# Patient Record
Sex: MALE | Race: WHITE
[De-identification: names, ages, dates, MRNs, and addresses within clinical notes are randomized per-mention and may not be internally consistent; named-entity substitution may affect disease eponyms.]

---

## 2017-01-14 ENCOUNTER — HOSPITAL ENCOUNTER (INPATIENT)
Dept: HOSPITAL 84 - D.REHAB | Age: 77
LOS: 6 days | Discharge: HOME HEALTH | DRG: 65 | End: 2017-01-20
Attending: FAMILY MEDICINE | Admitting: FAMILY MEDICINE
Payer: MEDICARE

## 2017-01-14 VITALS
BODY MASS INDEX: 22.26 KG/M2 | HEIGHT: 73 IN | BODY MASS INDEX: 22.26 KG/M2 | WEIGHT: 168 LBS | WEIGHT: 168 LBS | HEIGHT: 73 IN | BODY MASS INDEX: 22.26 KG/M2

## 2017-01-14 VITALS — DIASTOLIC BLOOD PRESSURE: 78 MMHG | SYSTOLIC BLOOD PRESSURE: 136 MMHG

## 2017-01-14 VITALS — SYSTOLIC BLOOD PRESSURE: 137 MMHG | DIASTOLIC BLOOD PRESSURE: 79 MMHG

## 2017-01-14 DIAGNOSIS — J90: ICD-10-CM

## 2017-01-14 DIAGNOSIS — Z98.890: ICD-10-CM

## 2017-01-14 DIAGNOSIS — J98.11: ICD-10-CM

## 2017-01-14 DIAGNOSIS — I63.9: Primary | ICD-10-CM

## 2017-01-14 DIAGNOSIS — R41.82: ICD-10-CM

## 2017-01-14 DIAGNOSIS — R13.10: ICD-10-CM

## 2017-01-14 DIAGNOSIS — Z66: ICD-10-CM

## 2017-01-14 DIAGNOSIS — E03.9: ICD-10-CM

## 2017-01-14 NOTE — NUR
PATIENT ARRIVED FROM [facility] WITH WIFE AND SON. PATIENT IS
ALERT/ORIENT X4. DIAG: CVA POST OP AAA. ADMISSION PAPERS SIGNED BY PATIENT.
V/S TAKEN. WT TAKEN. H/P TAKEN. MEDICATIONS ORDERED. DR MARVIN CARDENAS AWARE OF.

## 2017-01-14 NOTE — NUR
PT IS RESTING QUIETLY IN BED WITH EYES CLOSED. RESPS ARE EVEN AND UNLABORED.
NO ACUTE DISTRESS NOTED.

## 2017-01-14 NOTE — NUR
PATIENT STATED HE WAS NOT HUNGRY. DID NOT EAT ANY OF HIS SUPPER. DRANK [id number]
OF WATER SINCE ADMISSION

## 2017-01-14 NOTE — NUR
PT IS RESTING IN BED VISITING WITH FAMILY MEMBERS. ALERT AND ORIENTED X 3.
DENIES PAIN OR DISCOMFORT AT THIS TIME. VSS. O2 IS ON @ 3.5 LPM PER NC. PT
DENIES NEEDS AT THIS TIME. SR'S ARE UP X 3 IN BED. CALL LIGHT AND BEDSIDE
TABLE ARE WITHIN EASY REACH.

## 2017-01-14 NOTE — NUR
PT IS RESTING IN BED WITH EYES OPEN. ALERT AND ORIENTED X 3. DENIES ACUTE
DISCOMFORT AT THIS TIME. VSS. MIDLINE CHEST INCISION AND LEFT LEG INCISION ARE
HEALING WELL. NO SLURRING OF SPEECH NOTED AT THIS TIME. SR'S ARE UP X 3 IN
BED. CALL LIGHT AND BEDSIDE TABLE ARE WITHIN EASY.

## 2017-01-15 VITALS — DIASTOLIC BLOOD PRESSURE: 67 MMHG | SYSTOLIC BLOOD PRESSURE: 113 MMHG

## 2017-01-15 VITALS — SYSTOLIC BLOOD PRESSURE: 115 MMHG | DIASTOLIC BLOOD PRESSURE: 75 MMHG

## 2017-01-15 LAB
ANION GAP SERPL CALC-SCNC: 14.8 MMOL/L (ref 8–16)
BASOPHILS NFR BLD AUTO: 0.2 % (ref 0–2)
BUN SERPL-MCNC: 15 MG/DL (ref 7–18)
CALCIUM SERPL-MCNC: 8.9 MG/DL (ref 8.5–10.1)
CHLORIDE SERPL-SCNC: 107 MMOL/L (ref 98–107)
CO2 SERPL-SCNC: 24 MMOL/L (ref 21–32)
CREAT SERPL-MCNC: 1.2 MG/DL (ref 0.6–1.3)
EOSINOPHIL NFR BLD: 3.2 % (ref 0–7)
ERYTHROCYTE [DISTWIDTH] IN BLOOD BY AUTOMATED COUNT: 13.4 % (ref 11.5–14.5)
GLUCOSE SERPL-MCNC: 81 MG/DL (ref 74–106)
HCT VFR BLD CALC: 34.2 % (ref 42–54)
HGB BLD-MCNC: 11.1 G/DL (ref 13.5–17.5)
IMM GRANULOCYTES NFR BLD: 0.7 % (ref 0–5)
LYMPHOCYTES NFR BLD AUTO: 11.5 % (ref 15–50)
MCH RBC QN AUTO: 29.8 PG (ref 26–34)
MCHC RBC AUTO-ENTMCNC: 32.5 G/DL (ref 31–37)
MCV RBC: 91.7 FL (ref 80–100)
MONOCYTES NFR BLD: 10.8 % (ref 2–11)
NEUTROPHILS NFR BLD AUTO: 73.6 % (ref 40–80)
OSMOLALITY SERPL CALC.SUM OF ELEC: 280 MOSM/KG (ref 275–300)
PLATELET # BLD: 216 10X3/UL (ref 130–400)
PMV BLD AUTO: 11.5 FL (ref 7.4–10.4)
POTASSIUM SERPL-SCNC: 4.8 MMOL/L (ref 3.5–5.1)
RBC # BLD AUTO: 3.73 10X6/UL (ref 4.2–6.1)
SODIUM SERPL-SCNC: 141 MMOL/L (ref 136–145)
WBC # BLD AUTO: 12.2 10X3/UL (ref 4.8–10.8)

## 2017-01-15 NOTE — NUR
Pt. continues to be stable.  Wife visiting.  Staff assisting with adl's.  No
signs of discomfort or distress.

## 2017-01-15 NOTE — NUR
PT IS RESTING QUIELTY IN BED WITH EYES CLOSED. RESPS ARE EVEN AND UNLABORED.
NO ACUTE DISTRESS NOTED.

## 2017-01-15 NOTE — NUR
PATIENT TURNED ON CALL LIGHT TO USE BATHROOM. STAND BY ASST FROM BED TO
WHEELCHAIR AND WHEELCHAIR ONTO TOILET. ABLE TO DO OWN FREDERIC CARE.

## 2017-01-15 NOTE — NUR
PT RESTING QUIETLY, SENTENCES DON'T COINCIDE WITH CONVERSATION. PT SMILES
APPROPRIATELY, NO S/S OF ACUTE OF DISTRESS.

## 2017-01-15 NOTE — NUR
PATIENT SITTING UP IN BED EATTING BREAKFAST. ALERT/ORIENT X4. WITH SOME
SCRAMBLED SPEACH. CALL LIGHT WITHIN REACH. VOICES NO NEEDS

## 2017-01-16 VITALS — DIASTOLIC BLOOD PRESSURE: 72 MMHG | SYSTOLIC BLOOD PRESSURE: 133 MMHG

## 2017-01-16 VITALS — SYSTOLIC BLOOD PRESSURE: 129 MMHG | DIASTOLIC BLOOD PRESSURE: 78 MMHG

## 2017-01-16 NOTE — RHP
PATIENT: SABRINA LAMAR                                 MEDICAL RECORD: [id number]
ACCOUNT: [id number]                                    LOCATION:[facility]
: [birth year]                                            ADMISSION DATE: 17
                                                         
 
                     REHABILITATION HISTORY AND PHYSICAL EXAMINATION
                         POST ADMISSION PHYSICIAN EXAMINATION
 
 
DATE OF ADMISSION TO THE REHAB:  2017
 
ADMITTING DIAGNOSES:  Cerebrovascular accident with right body involvement, also
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
 
HISTORY OF PRESENT ILLNESS:  The patient is a 76-year-old gentleman who is
postop day #5 of a repair of an ascending aortic aneurysm with dissection and
rupture with hemopericardium on 2017.  Postop, he had a left parietal
ischemic infarct.  The patient's only past medical history is hypothyroidism. 
He was awakened by abdominal pain overnight with numbness and weakness in his
lower extremities, taken to local ED at [facility] where he was
diagnosed with dissecting aortic aneurysm that extended from the ascending down
to the level of the renal arteries.  He was transferred via helicopter to
[facility] for emergency surgical repair.  Immediately after
surgery and recovery, he was given fresh frozen plasma for an INR of 2.2.  This
postop coagulopathy resolved at that time.  According to progress note, on
postop day #1, he became disoriented with altered mental status, right-sided
weakness, and disjointed speech.  CT of his head revealed an ischemic CVA in the
left parietal lobe.  Ultrasound of carotids showed no sign of stenosis.  The
patient is currently alert and oriented to self, place, and other surrounding,
follows commands and answer appropriately; however, he is having difficulty
finding words and expressing himself.  Swallow study has not yet been done, but
he is on mechanical soft vegetarian cardiac diet.  The patient denies problems
with swallowing; however, chest x-ray  showed atelectasis in his
left lower lobe as well as small bilateral pleural effusions.  The patient is
showing right-sided deficits with weakness in his right upper and right lower
extremity veering to the right with ambulation and showing signs of peripheral
deficits and proprioception according to physical therapy notes.  The patient's
hemoglobin and hematocrit are stable, but has been declining postoperatively. 
His current H&H are 9.8 and 30.5, he has a platelet count of 115.  He is a
pleasant and cooperative gentleman with a good sense of humor and strong
spiritual jeffry.  He has been completely independent prior to this
hospitalization, living at home with his wife and is motivated to return home
with previous level of functioning.  His wife recently had a CVA back in 2016, still recovering, is unable to assist patient very much at this time.  She
does not drive because of cataracts.  The patient has a supportive son who lives
in the community, was able to provide transport as needed.  Acute inpatient
rehab here in the hospital is requested and definitely needed.
 
COMORBIDITIES:  In this patient include hypothyroidism, dysphagia, abdominal
aortic aneurysm that ruptured, pleural effusion, ischemic CVA, postop
coagulopathy, altered mental status, atelectasis of his left lower lobes, small
bilateral pleural effusions, and hypothyroidism.
 
PAST MEDICAL HISTORY:  Significant really just for hypothyroidism.
 
PAST SURGICAL HISTORY:  None, other than repair of dissecting aortic aneurysm.
 
ALLERGIES:  No known drug allergies.
 
 
 
HISTORY AND PHYSICAL                           [id number]    SABRINA LAMAR         
 
 
 
CURRENT MEDICATIONS:  Include aspirin chewable 81 mg daily, metoprolol 25 mg
daily, Cozaar 50 mg daily, Synthroid 125 mcg daily, hydrocodone 1 tab q. 4 hours
p.r.n., Colace 100 mg b.i.d., and Pravachol 20 mg at bedtime.
 
HABITS:  No current alcohol or tobacco use.
 
FAMILY HISTORY:  Noncontributory.
 
SOCIAL HISTORY:  The patient hopes to return home with his spouse and get back
to his prior level of functioning, does have a strong family support.
 
REVIEW OF SYSTEMS:
GENERAL:  Does complain of weakness and fatigue.
HEENT:  Denies cold, cough, or congestion.
CARDIOVASCULAR:  Denies chest pain.
 
PHYSICAL EXAMINATION:
VITAL SIGNS:  Stable, afebrile.
GENERAL:  A well-developed gentleman in no acute distress, alert upon exam.
HEENT:  Normocephalic, atraumatic.  Mucosa moist.
NECK:  Supple.  No lymphadenopathy.
LUNGS:  Clear at this time.
HEART:  Regular rate and rhythm.
ABDOMEN:  Benign.
EXTREMITIES:  No clubbing, cyanosis or edema.
NEUROLOGIC:  Intact.
 
LABORATORY DATA:  His white count 12.3, H&H of 11 and 34 and platelet count 216.
 His sodium is 141, potassium 4.8, BUN and creatinine of 15 and 1.2, and blood
sugar is noted to be 81.
 
ASSESSMENT:  This is a 76-year-old gentleman admitted to the rehab with a
working diagnosis of cerebrovascular accident, status post repair of a
dissecting abdominal aortic aneurysm.  The patient has potential to make
improvement.  We instituted the following multidisciplinary therapies included
to, but not limited to physical, occupational, respiratory, speech, nutritional
services, prosthetics and orthotics.  Given his complex condition and risk for
more complications, rehabilitation services cannot be provided at a low level of
care such as a skilled nursing facility.
 
PLAN:
1.  Admit to [facility] rehab for intensive inpatient therapy to
include the following disciplines:
A.  Physical therapy to improve gait, all transfer skills and bed mobility to a
modified independent level.
B.  Occupational therapy to improve activities of daily living to a modified
independent level.
C.  Case management to assist with discharge planning and placement options.
D.  Nutrition to assist with nutritional needs.
E.  Rehabilitation nursing to assist in monitoring the patient's underlying
medical conditions and to assist with any type of bowel or bladder management.
2.  The patient's current medication and medical care will be continued.
3.  The patient will be placed on standard fall precautions.
4.  The patient's estimated length of stay is approximately 7-10 days.
 
 
 
HISTORY AND PHYSICAL                           [id number]    SABRINA LAMAR         
 
 
5.  Discuss this patient during care team staff meeting this week.
 
TRANSINT:[id number] Voice Confirmation ID: [id number] DOCUMENT ID: [id number]
 
 
                                           
                                           AVTAR CARDENAS MD               
 
 
 
Electronically Signed by AVTAR CARDENAS on 17 at 1357
 
 
 
 
 
 
 
 
 
 
 
 
 
 
 
 
 
 
 
 
 
 
 
 
 
 
 
 
 
 
 
 
 
 
 
 
CC:                                                             [id number]
DICTATION DATE: 01/15/17 1301     :     01/15/17 1338      ADM IN  
                                                                              
[facility]                                          
[address] [place] [address]

## 2017-01-16 NOTE — NUR
PT ASSISTED TO THE BATHROOM WITH CGA. VOIDED WITHOUT DIFFICULTY. NO NEEDS
VOICED. NO COMPLAINT OF PAIN OR DISCOMFORT VOICED. VSS. SR'S ARE UP X 2 IN
BED. CALL LIGHT AND BEDSIDE TABLE ARE WITHIN EASY REACH. BOX ALARM IN USE.

## 2017-01-16 NOTE — NUR
PT AWAKE, ASSISTED SBA WHILE PT AMBULATED TO BATHROOM, PT GAIT APPEARS
SOMEWHAT UNSTABLE, PT SENTENCES DON'T RESPOND APPROPRIATELY WITH QUESTION -
FOR EXAMPLE 'HAVE YOU BEEN ABLE TO SLEEP' STATED 'MY WIFE SHOULD BE HERE
SOON.'
PLEASANT, MOBILITY SBA, RESPIRATIONS REGULAR AND UNLABORED, NO S/S OF ACUTE
DISTRESS.

## 2017-01-16 NOTE — NUR
PT RESTING SEMI SERVIN POSITION, RESPIRATIONS REGULAR AND UNLABORED, EYES
CLOSED, NO S/S OF ACUTE DISTRESS.

## 2017-01-17 VITALS — DIASTOLIC BLOOD PRESSURE: 85 MMHG | SYSTOLIC BLOOD PRESSURE: 132 MMHG

## 2017-01-17 VITALS — SYSTOLIC BLOOD PRESSURE: 140 MMHG | DIASTOLIC BLOOD PRESSURE: 89 MMHG

## 2017-01-17 NOTE — NUR
PT RESTING IN BED WITH EYES OPEN. ALERT AND ORIENTED X 4. DENIES ANY
DISCOMFORT. DRESSED AND READY FOR THERAPY TODAY.

## 2017-01-17 NOTE — NUR
PT. IN BED WITH HOB UP FOR COMFORT AND HAS NO COMPLAINTS/NEEDS AT THIS TIME.
ASSESSMENT COMPLETED. CALL LIGHT WITHIN REACH.

## 2017-01-18 VITALS — SYSTOLIC BLOOD PRESSURE: 120 MMHG | DIASTOLIC BLOOD PRESSURE: 79 MMHG

## 2017-01-18 VITALS — SYSTOLIC BLOOD PRESSURE: 139 MMHG | DIASTOLIC BLOOD PRESSURE: 79 MMHG

## 2017-01-18 LAB
ANION GAP SERPL CALC-SCNC: 10.8 MMOL/L (ref 8–16)
BASOPHILS NFR BLD AUTO: 0.1 % (ref 0–2)
BUN SERPL-MCNC: 10 MG/DL (ref 7–18)
CALCIUM SERPL-MCNC: 9.5 MG/DL (ref 8.5–10.1)
CHLORIDE SERPL-SCNC: 105 MMOL/L (ref 98–107)
CO2 SERPL-SCNC: 29.1 MMOL/L (ref 21–32)
CREAT SERPL-MCNC: 1.1 MG/DL (ref 0.6–1.3)
EOSINOPHIL NFR BLD: 2.9 % (ref 0–7)
ERYTHROCYTE [DISTWIDTH] IN BLOOD BY AUTOMATED COUNT: 13.6 % (ref 11.5–14.5)
GLUCOSE SERPL-MCNC: 115 MG/DL (ref 74–106)
HCT VFR BLD CALC: 35.3 % (ref 42–54)
HGB BLD-MCNC: 11.2 G/DL (ref 13.5–17.5)
IMM GRANULOCYTES NFR BLD: 1.5 % (ref 0–5)
LYMPHOCYTES NFR BLD AUTO: 11.9 % (ref 15–50)
MCH RBC QN AUTO: 29 PG (ref 26–34)
MCHC RBC AUTO-ENTMCNC: 31.7 G/DL (ref 31–37)
MCV RBC: 91.5 FL (ref 80–100)
MONOCYTES NFR BLD: 11.4 % (ref 2–11)
NEUTROPHILS NFR BLD AUTO: 72.2 % (ref 40–80)
OSMOLALITY SERPL CALC.SUM OF ELEC: 278 MOSM/KG (ref 275–300)
PLATELET # BLD: 314 10X3/UL (ref 130–400)
PMV BLD AUTO: 10.7 FL (ref 7.4–10.4)
POTASSIUM SERPL-SCNC: 4.9 MMOL/L (ref 3.5–5.1)
RBC # BLD AUTO: 3.86 10X6/UL (ref 4.2–6.1)
SODIUM SERPL-SCNC: 140 MMOL/L (ref 136–145)
WBC # BLD AUTO: 12.9 10X3/UL (ref 4.8–10.8)

## 2017-01-18 NOTE — NUR
PT. IN BED WITH HOB UP FOR COMFORT WITH EYES CLOSED AND RESP. EVEN. PT.
AWAKENED EASILY FOR HIS MORNING MEDICATIONS AND THEN WENT BACK TO SLEEP. CALL
LIGHT WITHIN REACH.

## 2017-01-18 NOTE — NUR
PT. IN BED WITH HOB UP FOR COMFORT WITH EYES CLOSED AND RESP. EVEN. NO VISIBLE
DISTRESS OBSERVED. CALL LIGHT WITHIN REACH.

## 2017-01-18 NOTE — NUR
CARE TEAM MEETING:
PATIENT SPOUSE ATTENDED THE MEETING AND TENATIVE DISCHARGE DATE IS 1/20/17.
PATIENT WILL HAVE HOME HEALTH AND THAT WILL INCLUDE SPEECH. WILL CONTINUE TO
FOLLOW WITH PATIENT UNTIL DISCHARGED. SPOUSE IS TO CHECK TO SEE IF THERE IS A
WALKER AT HOME.

## 2017-01-19 VITALS — SYSTOLIC BLOOD PRESSURE: 100 MMHG | DIASTOLIC BLOOD PRESSURE: 66 MMHG

## 2017-01-19 VITALS — DIASTOLIC BLOOD PRESSURE: 65 MMHG | SYSTOLIC BLOOD PRESSURE: 134 MMHG

## 2017-01-19 NOTE — NUR
ASSESSMENT AND HS MEDS COMPLETE. DENIES NEEDS. HAD PATIENT SIGN BED ALARM
WAIVER AS HE WANTS TO BE ABLE TO GO TO BR ALONE, AND USES W/C AND TRANSFERS
SAFELY. HE DISCHARGES TOMORROW.

## 2017-01-19 NOTE — NUR
PATIENT TO BE DISCHARGED HOME TOMORROW. PATIENT IS INDEPENDANT IN ROOM.
WALKING TO AND FROM THE BATHROOM WITH A STEADY GAIT

## 2017-01-19 NOTE — NUR
PATIENT ALERT/ORIENT X4. SITTING UP AT THE SIDE OF THE BED TO EAT BREAKFAST.
CALL LIGHT WITHIN REACH. VOICES NO NEEDS

## 2017-01-19 NOTE — NUR
Nutrition Follow Up:
Pt was in therapy at the time of RD visit. Pt interview deferred at this time.
Chart reviewed. Pt scheduled to d/c 1/20/17.
Diet: Regular Vegetarian Mech Soft
PO Intake: 58% (8 meal avg)
+BM 1/18/17
Wt loss of 2# since admit
Meds and labs noted
Pt with fair po intake at this time. Rec continue current diet. Will continue
to provide selective menus and honor food preferences. RD following.

## 2017-01-19 NOTE — NUR
PATIENT DISCHARGING HOPME WITH FAMILY. [facility] AT HOME WILL PROVIDE NURSING
AND SPEECH. PATIENT HAS WALKER , NO OTHER DME NEEDED AT THIS TIME. DR. LIM
1/25/17 @ 2:15. PATIENT CHOICE FORM FOR HOME HEALTH AND [facility] FORM SIGNED ,
EXPLAINED AND FILED IN CHART. WILL CONTINUE TO FOLLOW WITH PATIENT UNTIL
DISCHARGED

## 2017-01-20 VITALS — DIASTOLIC BLOOD PRESSURE: 79 MMHG | SYSTOLIC BLOOD PRESSURE: 134 MMHG

## 2017-01-20 LAB
ANION GAP SERPL CALC-SCNC: 11.8 MMOL/L (ref 8–16)
BASOPHILS NFR BLD AUTO: 0.1 % (ref 0–2)
BUN SERPL-MCNC: 10 MG/DL (ref 7–18)
CALCIUM SERPL-MCNC: 9 MG/DL (ref 8.5–10.1)
CHLORIDE SERPL-SCNC: 106 MMOL/L (ref 98–107)
CO2 SERPL-SCNC: 25.8 MMOL/L (ref 21–32)
CREAT SERPL-MCNC: 1.1 MG/DL (ref 0.6–1.3)
EOSINOPHIL NFR BLD: 2.4 % (ref 0–7)
ERYTHROCYTE [DISTWIDTH] IN BLOOD BY AUTOMATED COUNT: 13.8 % (ref 11.5–14.5)
GLUCOSE SERPL-MCNC: 99 MG/DL (ref 74–106)
HCT VFR BLD CALC: 36 % (ref 42–54)
HGB BLD-MCNC: 11.4 G/DL (ref 13.5–17.5)
IMM GRANULOCYTES NFR BLD: 1.4 % (ref 0–5)
LYMPHOCYTES NFR BLD AUTO: 10.1 % (ref 15–50)
MCH RBC QN AUTO: 29.2 PG (ref 26–34)
MCHC RBC AUTO-ENTMCNC: 31.7 G/DL (ref 31–37)
MCV RBC: 92.1 FL (ref 80–100)
MONOCYTES NFR BLD: 8.5 % (ref 2–11)
NEUTROPHILS NFR BLD AUTO: 77.5 % (ref 40–80)
OSMOLALITY SERPL CALC.SUM OF ELEC: 276 MOSM/KG (ref 275–300)
PLATELET # BLD: 338 10X3/UL (ref 130–400)
PMV BLD AUTO: 11 FL (ref 7.4–10.4)
POTASSIUM SERPL-SCNC: 4.6 MMOL/L (ref 3.5–5.1)
RBC # BLD AUTO: 3.91 10X6/UL (ref 4.2–6.1)
SODIUM SERPL-SCNC: 139 MMOL/L (ref 136–145)
WBC # BLD AUTO: 14.8 10X3/UL (ref 4.8–10.8)

## 2017-01-20 NOTE — NUR
PT WAS RECEIVED AT THE BEGINNING OF THE SHIFT.  HE WAS AND IS ALERT AND
ORIENTED X 3.  HE VOICED NO COMPLAINTS OF PAIN OR DISCOMFORT.  STAFF
MONITORING AND ASSISTING PRN WITH ADL'S.

## 2017-01-20 NOTE — NUR
PT. WAS DISCHARGED FROM REHAB UNIT.  HE WAS STABLE UPON LEAVING.  HE WAS
DISCHARGED WITH PAPERWORK.  HIS MEDICATIONS WERE PHONED INTO HIS PHARMACY.

## 2017-03-07 ENCOUNTER — HOSPITAL ENCOUNTER (OUTPATIENT)
Dept: HOSPITAL 84 - D.LAB | Age: 77
Discharge: HOME | End: 2017-03-07
Attending: FAMILY MEDICINE
Payer: COMMERCIAL

## 2017-03-07 VITALS — BODY MASS INDEX: 22.1 KG/M2

## 2017-03-07 DIAGNOSIS — N39.0: ICD-10-CM

## 2017-03-07 DIAGNOSIS — R35.0: Primary | ICD-10-CM

## 2017-03-07 DIAGNOSIS — R41.0: ICD-10-CM

## 2017-03-07 LAB
ANION GAP SERPL CALC-SCNC: 11.5 MMOL/L (ref 8–16)
APPEARANCE UR: CLEAR
BILIRUB SERPL-MCNC: NEGATIVE MG/DL
BUN SERPL-MCNC: 17 MG/DL (ref 7–18)
CALCIUM SERPL-MCNC: 8.8 MG/DL (ref 8.5–10.1)
CHLORIDE SERPL-SCNC: 102 MMOL/L (ref 98–107)
CO2 SERPL-SCNC: 29 MMOL/L (ref 21–32)
COLOR UR: YELLOW
CREAT SERPL-MCNC: 1.2 MG/DL (ref 0.6–1.3)
GLUCOSE SERPL-MCNC: 113 MG/DL (ref 74–106)
GLUCOSE SERPL-MCNC: NEGATIVE MG/DL
KETONES UR STRIP-MCNC: NEGATIVE MG/DL
LEUKOCYTE ESTERASE: NEGATIVE
NITRITE UR-MCNC: NEGATIVE MG/ML
OSMOLALITY SERPL CALC.SUM OF ELEC: 278 MOSM/KG (ref 275–300)
PH UR STRIP: 5 [PH] (ref 5–6)
POTASSIUM SERPL-SCNC: 4.5 MMOL/L (ref 3.5–5.1)
PROT UR-MCNC: NEGATIVE MG/DL
SODIUM SERPL-SCNC: 138 MMOL/L (ref 136–145)
SP GR UR STRIP: 1.01 (ref 1–1.02)
UROBILINOGEN UR-MCNC: NORMAL MG/DL

## 2017-03-10 ENCOUNTER — HOSPITAL ENCOUNTER (INPATIENT)
Dept: HOSPITAL 84 - D.ER | Age: 77
LOS: 4 days | Discharge: HOME | DRG: 392 | End: 2017-03-14
Attending: SURGERY | Admitting: SURGERY
Payer: MEDICARE

## 2017-03-10 VITALS
BODY MASS INDEX: 16.23 KG/M2 | HEIGHT: 73 IN | WEIGHT: 122.44 LBS | BODY MASS INDEX: 16.23 KG/M2 | WEIGHT: 122.44 LBS | HEIGHT: 73 IN | BODY MASS INDEX: 16.23 KG/M2

## 2017-03-10 VITALS — SYSTOLIC BLOOD PRESSURE: 134 MMHG | DIASTOLIC BLOOD PRESSURE: 71 MMHG

## 2017-03-10 VITALS — DIASTOLIC BLOOD PRESSURE: 55 MMHG | SYSTOLIC BLOOD PRESSURE: 121 MMHG

## 2017-03-10 DIAGNOSIS — J44.9: ICD-10-CM

## 2017-03-10 DIAGNOSIS — K57.20: Primary | ICD-10-CM

## 2017-03-10 LAB
ALBUMIN SERPL-MCNC: 2.7 G/DL (ref 3.4–5)
ALP SERPL-CCNC: 116 U/L (ref 46–116)
ALT SERPL-CCNC: 20 U/L (ref 10–68)
ANION GAP SERPL CALC-SCNC: 13.2 MMOL/L (ref 8–16)
APPEARANCE UR: (no result)
BASOPHILS NFR BLD AUTO: 0.1 % (ref 0–2)
BILIRUB SERPL-MCNC: 0.58 MG/DL (ref 0.2–1.3)
BILIRUB SERPL-MCNC: NEGATIVE MG/DL
BUN SERPL-MCNC: 9 MG/DL (ref 7–18)
CALCIUM SERPL-MCNC: 9 MG/DL (ref 8.5–10.1)
CHLORIDE SERPL-SCNC: 102 MMOL/L (ref 98–107)
CO2 SERPL-SCNC: 26.9 MMOL/L (ref 21–32)
COLOR UR: YELLOW
CREAT SERPL-MCNC: 1.1 MG/DL (ref 0.6–1.3)
EOSINOPHIL NFR BLD: 0.7 % (ref 0–7)
ERYTHROCYTE [DISTWIDTH] IN BLOOD BY AUTOMATED COUNT: 14.4 % (ref 11.5–14.5)
GLOBULIN SER-MCNC: 2.8 G/L
GLUCOSE SERPL-MCNC: 93 MG/DL (ref 74–106)
GLUCOSE SERPL-MCNC: NEGATIVE MG/DL
HCT VFR BLD CALC: 36.8 % (ref 42–54)
HGB BLD-MCNC: 11.5 G/DL (ref 13.5–17.5)
IMM GRANULOCYTES NFR BLD: 0.4 % (ref 0–5)
KETONES UR STRIP-MCNC: (no result) MG/DL
LEUKOCYTE ESTERASE: NEGATIVE
LIPASE SERPL-CCNC: 68 U/L (ref 73–393)
LYMPHOCYTES NFR BLD AUTO: 6.8 % (ref 15–50)
MCH RBC QN AUTO: 27.2 PG (ref 26–34)
MCHC RBC AUTO-ENTMCNC: 31.3 G/DL (ref 31–37)
MCV RBC: 87 FL (ref 80–100)
MONOCYTES NFR BLD: 9 % (ref 2–11)
NEUTROPHILS NFR BLD AUTO: 83 % (ref 40–80)
NITRITE UR-MCNC: NEGATIVE MG/ML
OSMOLALITY SERPL CALC.SUM OF ELEC: 274 MOSM/KG (ref 275–300)
PH UR STRIP: 5 [PH] (ref 5–6)
PLATELET # BLD: 229 10X3/UL (ref 130–400)
PMV BLD AUTO: 10.9 FL (ref 7.4–10.4)
POTASSIUM SERPL-SCNC: 4.1 MMOL/L (ref 3.5–5.1)
PROT SERPL-MCNC: 5.5 G/DL (ref 6.4–8.2)
PROT UR-MCNC: NEGATIVE MG/DL
RBC # BLD AUTO: 4.23 10X6/UL (ref 4.2–6.1)
SODIUM SERPL-SCNC: 138 MMOL/L (ref 136–145)
SP GR UR STRIP: 1.02 (ref 1–1.02)
UROBILINOGEN UR-MCNC: NORMAL MG/DL
WBC # BLD AUTO: 13.7 10X3/UL (ref 4.8–10.8)

## 2017-03-10 NOTE — NUR
PATIENT TO ROOM AT THIS TIME. ADMITTED AND ASSESSED AT THIS TIME. ORIENTED TO
ROOM AND CALL LIGHT. NO COMPLAINTS AT THIS TIME. NO PAIN AT THIS TIME.
EXPLAINED PCA IS AVAILABLE IF NEEDED. FAMILY AT BEDSIDE. CALL LIGHT WITHIN
REACH.

## 2017-03-10 NOTE — NUR
IR NURSE IN SPEAKING WITH PATIENT AT THIS TIME ABOUT PROCEDURE. FAMILY AT
BEDSIDE. CALL LIGHT WITHIN REACH.

## 2017-03-10 NOTE — NUR
CALLED DR. HOUGH TO SEE IF PATIENT COULD EAT OR DRINK AT THIS TIME SINCE NO
IR PROCEDURE. NEW ORDERS RECIEVED AND CARRIED OUT.

## 2017-03-11 VITALS — DIASTOLIC BLOOD PRESSURE: 54 MMHG | SYSTOLIC BLOOD PRESSURE: 107 MMHG

## 2017-03-11 VITALS — SYSTOLIC BLOOD PRESSURE: 170 MMHG | DIASTOLIC BLOOD PRESSURE: 71 MMHG

## 2017-03-11 VITALS — DIASTOLIC BLOOD PRESSURE: 65 MMHG | SYSTOLIC BLOOD PRESSURE: 132 MMHG

## 2017-03-11 VITALS — DIASTOLIC BLOOD PRESSURE: 62 MMHG | SYSTOLIC BLOOD PRESSURE: 109 MMHG

## 2017-03-11 VITALS — DIASTOLIC BLOOD PRESSURE: 63 MMHG | SYSTOLIC BLOOD PRESSURE: 128 MMHG

## 2017-03-11 VITALS — SYSTOLIC BLOOD PRESSURE: 100 MMHG | DIASTOLIC BLOOD PRESSURE: 52 MMHG

## 2017-03-11 LAB
ANION GAP SERPL CALC-SCNC: 11.3 MMOL/L (ref 8–16)
BASOPHILS NFR BLD AUTO: 0.1 % (ref 0–2)
BUN SERPL-MCNC: 8 MG/DL (ref 7–18)
CALCIUM SERPL-MCNC: 9.2 MG/DL (ref 8.5–10.1)
CHLORIDE SERPL-SCNC: 106 MMOL/L (ref 98–107)
CO2 SERPL-SCNC: 28.3 MMOL/L (ref 21–32)
CREAT SERPL-MCNC: 1.2 MG/DL (ref 0.6–1.3)
EOSINOPHIL NFR BLD: 1.4 % (ref 0–7)
ERYTHROCYTE [DISTWIDTH] IN BLOOD BY AUTOMATED COUNT: 14.3 % (ref 11.5–14.5)
GLUCOSE SERPL-MCNC: 97 MG/DL (ref 74–106)
HCT VFR BLD CALC: 33.2 % (ref 42–54)
HGB BLD-MCNC: 10.2 G/DL (ref 13.5–17.5)
IMM GRANULOCYTES NFR BLD: 0.3 % (ref 0–5)
LYMPHOCYTES NFR BLD AUTO: 8 % (ref 15–50)
MCH RBC QN AUTO: 26.8 PG (ref 26–34)
MCHC RBC AUTO-ENTMCNC: 30.7 G/DL (ref 31–37)
MCV RBC: 87.4 FL (ref 80–100)
MONOCYTES NFR BLD: 8.4 % (ref 2–11)
NEUTROPHILS NFR BLD AUTO: 81.8 % (ref 40–80)
OSMOLALITY SERPL CALC.SUM OF ELEC: 278 MOSM/KG (ref 275–300)
PLATELET # BLD: 231 10X3/UL (ref 130–400)
PMV BLD AUTO: 11.5 FL (ref 7.4–10.4)
POTASSIUM SERPL-SCNC: 4.6 MMOL/L (ref 3.5–5.1)
RBC # BLD AUTO: 3.8 10X6/UL (ref 4.2–6.1)
SODIUM SERPL-SCNC: 141 MMOL/L (ref 136–145)
WBC # BLD AUTO: 11.3 10X3/UL (ref 4.8–10.8)

## 2017-03-11 NOTE — NUR
MORNING MEDS GIVEN AT THIS TIME. PUT UP AMBULATING AROUND ROOM. RATING CURRENT
PAIN IN ABD 2/10. BED LOW, CALL LIGHT IN REACH, DENIES NEEDS. CPOC.

## 2017-03-11 NOTE — NUR
IV SITE TO LEFT WRIST LEAKING. UNABLE TO FLUSH. IV DC'D WITH CATHETER INTACT.
PRESSURE DRESSING APPLIED. IV RESITED TO L FOREARM. X1 ATTEMPT WITH 20 GUAGE
IV CATHETER. RECONNECTED TO IV TUBING. BED LOW, CALL LIGHT IN REACH, DENIES
NEEDS. CPOC.

## 2017-03-11 NOTE — NUR
ASSESSMENT COMPLETED, NO ACUTE DISTRESS NOTED, REQUEST SHOWER, IV DISCONNECTED
AND SITE WRAPPED, CL IN REACH, WILL MONITOR

## 2017-03-11 NOTE — NUR
IV LOPRESSOR GIVEN PER MAR, J CARLOS WELL, NO DISTRESS NOTED, DENIES PAIN OR NEEDS,
SR'S UP X2, CL IN REACH

## 2017-03-11 NOTE — NUR
PT REC'D FROM ETSHER HUGHES. UP AT BEDSIDE ON PHONE. AAOX4. BED LOW, CALL LIGHT
IN REACH, DENIES NEEDS. CPOC.

## 2017-03-11 NOTE — NUR
PATIENT IS RESTING IN BED.  PATIENT IS AWAKE, ALERT, AND ORIENTED X4.  NO
COMPLAINTS OF PAIN AT PRESENT TIME.  SCHEDULED SYNTHROID 75 MCG IV GIVEN TO
PATIENT.  PATIENT TOLERATED WELL.  PATIENT DENIES ANY NEEDS AT PRESENT TIME.
CALL LIGHT IN PATIENT'S REACH.  WILL MONITOR.

## 2017-03-12 VITALS — SYSTOLIC BLOOD PRESSURE: 144 MMHG | DIASTOLIC BLOOD PRESSURE: 76 MMHG

## 2017-03-12 VITALS — DIASTOLIC BLOOD PRESSURE: 62 MMHG | SYSTOLIC BLOOD PRESSURE: 123 MMHG

## 2017-03-12 VITALS — DIASTOLIC BLOOD PRESSURE: 76 MMHG | SYSTOLIC BLOOD PRESSURE: 142 MMHG

## 2017-03-12 VITALS — DIASTOLIC BLOOD PRESSURE: 73 MMHG | SYSTOLIC BLOOD PRESSURE: 161 MMHG

## 2017-03-12 VITALS — DIASTOLIC BLOOD PRESSURE: 77 MMHG | SYSTOLIC BLOOD PRESSURE: 131 MMHG

## 2017-03-12 VITALS — DIASTOLIC BLOOD PRESSURE: 83 MMHG | SYSTOLIC BLOOD PRESSURE: 138 MMHG

## 2017-03-12 LAB
ANION GAP SERPL CALC-SCNC: 12.9 MMOL/L (ref 8–16)
BASOPHILS NFR BLD AUTO: 0.1 % (ref 0–2)
BUN SERPL-MCNC: 6 MG/DL (ref 7–18)
CALCIUM SERPL-MCNC: 8.5 MG/DL (ref 8.5–10.1)
CHLORIDE SERPL-SCNC: 105 MMOL/L (ref 98–107)
CO2 SERPL-SCNC: 26.1 MMOL/L (ref 21–32)
CREAT SERPL-MCNC: 1.3 MG/DL (ref 0.6–1.3)
EOSINOPHIL NFR BLD: 1.2 % (ref 0–7)
ERYTHROCYTE [DISTWIDTH] IN BLOOD BY AUTOMATED COUNT: 14.5 % (ref 11.5–14.5)
GLUCOSE SERPL-MCNC: 108 MG/DL (ref 74–106)
HCT VFR BLD CALC: 33.7 % (ref 42–54)
HGB BLD-MCNC: 10.4 G/DL (ref 13.5–17.5)
IMM GRANULOCYTES NFR BLD: 0.6 % (ref 0–5)
LYMPHOCYTES NFR BLD AUTO: 7.9 % (ref 15–50)
MCH RBC QN AUTO: 26.8 PG (ref 26–34)
MCHC RBC AUTO-ENTMCNC: 30.9 G/DL (ref 31–37)
MCV RBC: 86.9 FL (ref 80–100)
MONOCYTES NFR BLD: 8.8 % (ref 2–11)
NEUTROPHILS NFR BLD AUTO: 81.4 % (ref 40–80)
OSMOLALITY SERPL CALC.SUM OF ELEC: 277 MOSM/KG (ref 275–300)
PLATELET # BLD: 221 10X3/UL (ref 130–400)
PMV BLD AUTO: 11.1 FL (ref 7.4–10.4)
POTASSIUM SERPL-SCNC: 4 MMOL/L (ref 3.5–5.1)
RBC # BLD AUTO: 3.88 10X6/UL (ref 4.2–6.1)
SODIUM SERPL-SCNC: 140 MMOL/L (ref 136–145)
WBC # BLD AUTO: 9.8 10X3/UL (ref 4.8–10.8)

## 2017-03-12 NOTE — NUR
MORNING MEDS PASSED AT THIS TIME. IVP LEVOTHYROXINE GIVEN BY ESTHER DOMINGO. NO
COMPLAINTS. BED LOW, CALL LIGHT IN REACH, DENIES NEEDS. CPOC.

## 2017-03-12 NOTE — NUR
PT REC'D FROM ESTHER POOL. PT RESTING IN BED. AAOX4. RATING CURRENT PAIN IN ABD
2/10. IV TO L FOREARM FREE OF REDDNESS AND SWELLING. CHASKAE PLAZAA, IN ROOM
OBTAINING VS. VSS. BED LOW, CALL LIGHT IN REACH, DENIES NEEDS. CPOC.

## 2017-03-12 NOTE — NUR
PATIENT IS AWAKE, ALERT AND ORIENTED X'S 4. NO SIGNS OF DISTRESS NOTED.
ADMINISTERED SYNTHROID, IV AS ORDERED. PATIENT IS IN BED, HOB 40 DEGREES.
PATIENT DENIES NEEDS.

## 2017-03-12 NOTE — NUR
PT LYING IN BED TALKING ON PHONE, ASSESSMENT COMPLETED, NO ACUTE DISTRESS
NOTED, DENIES PAIN OR NEEDS AT THIS TIME, FALL PRECAUTIONS IN PLACE, CL IN
REACH, WILL MONITOR

## 2017-03-12 NOTE — NUR
PT RESTING IN BED WATCHING TV. NO COMPLAINTS OF PAIN AT THIS TIME. BED LOW,
CALL LIGHT IN REACH, DENIES NEEDS. CPOC.

## 2017-03-13 VITALS — DIASTOLIC BLOOD PRESSURE: 82 MMHG | SYSTOLIC BLOOD PRESSURE: 147 MMHG

## 2017-03-13 VITALS — SYSTOLIC BLOOD PRESSURE: 128 MMHG | DIASTOLIC BLOOD PRESSURE: 67 MMHG

## 2017-03-13 VITALS — SYSTOLIC BLOOD PRESSURE: 133 MMHG | DIASTOLIC BLOOD PRESSURE: 65 MMHG

## 2017-03-13 VITALS — SYSTOLIC BLOOD PRESSURE: 125 MMHG | DIASTOLIC BLOOD PRESSURE: 60 MMHG

## 2017-03-13 VITALS — SYSTOLIC BLOOD PRESSURE: 133 MMHG | DIASTOLIC BLOOD PRESSURE: 73 MMHG

## 2017-03-13 VITALS — SYSTOLIC BLOOD PRESSURE: 133 MMHG | DIASTOLIC BLOOD PRESSURE: 76 MMHG

## 2017-03-13 LAB
ANION GAP SERPL CALC-SCNC: 9.8 MMOL/L (ref 8–16)
BASOPHILS NFR BLD AUTO: 0.1 % (ref 0–2)
BUN SERPL-MCNC: 3 MG/DL (ref 7–18)
CALCIUM SERPL-MCNC: 9.1 MG/DL (ref 8.5–10.1)
CHLORIDE SERPL-SCNC: 107 MMOL/L (ref 98–107)
CO2 SERPL-SCNC: 27.8 MMOL/L (ref 21–32)
CREAT SERPL-MCNC: 1.1 MG/DL (ref 0.6–1.3)
EOSINOPHIL NFR BLD: 1.7 % (ref 0–7)
ERYTHROCYTE [DISTWIDTH] IN BLOOD BY AUTOMATED COUNT: 14.4 % (ref 11.5–14.5)
GLUCOSE SERPL-MCNC: 94 MG/DL (ref 74–106)
HCT VFR BLD CALC: 36.4 % (ref 42–54)
HGB BLD-MCNC: 11.2 G/DL (ref 13.5–17.5)
IMM GRANULOCYTES NFR BLD: 0.8 % (ref 0–5)
LYMPHOCYTES NFR BLD AUTO: 11.1 % (ref 15–50)
MCH RBC QN AUTO: 26.5 PG (ref 26–34)
MCHC RBC AUTO-ENTMCNC: 30.8 G/DL (ref 31–37)
MCV RBC: 86.3 FL (ref 80–100)
MONOCYTES NFR BLD: 9.7 % (ref 2–11)
NEUTROPHILS NFR BLD AUTO: 76.6 % (ref 40–80)
OSMOLALITY SERPL CALC.SUM OF ELEC: 277 MOSM/KG (ref 275–300)
PLATELET # BLD: 277 10X3/UL (ref 130–400)
PMV BLD AUTO: 10.9 FL (ref 7.4–10.4)
POTASSIUM SERPL-SCNC: 3.6 MMOL/L (ref 3.5–5.1)
RBC # BLD AUTO: 4.22 10X6/UL (ref 4.2–6.1)
SODIUM SERPL-SCNC: 141 MMOL/L (ref 136–145)
WBC # BLD AUTO: 12.1 10X3/UL (ref 4.8–10.8)

## 2017-03-13 NOTE — NUR
PT ASSESSMENT COMPLETE NO ACUTE DISTRESS NOTED VOICES ALL NEEDS TO STAFF AWAKE
AND ALERT ORINETED X 3 LUNGS CLAER BILATERALLY COMPLAINS OF "IMPACTION" BSA X
4 QUADS DENIES ABDOMINAL TENDERNESS. REQUESTING A LAXITIVE INFORMED PT THAT
REQUEST WOULD BE MADE KNOWN TO HIS PHYSICIAN. WILL MONITOR.

## 2017-03-13 NOTE — NUR
PT AWAKE SITTING ON SIDE OF BED NO ACUTE DISTRESS NOTED VOCES ALL NEEDS TO
STAFF. CALL LIGHT IN REACH SIDE RAILS UP X 2

## 2017-03-13 NOTE — NUR
GIVEN IV LOPRESSOR PER ORDER TOLERATES WELL. DENIES PAIN OR DISCOMFORT OTHER
THATN FEELING CONSTIPATED. BSA X 4 QUADS ABD SOFT AND NON TENDER

## 2017-03-13 NOTE — NUR
Patient Name: SABRINA LAMAR                                   Admission
Status: ER
Accout number: [id number]                            Admission Date:
03-
: 1940                                                      Admission
Diagnosis:
Attending: EDGAR                                              Current
LOS:  3
 
Anticipated DC Date:
03-
Planned Disposition: Home
Primary Insurance: MEDICARE PART A ONLY
 
 
Discharge Planning Comments:
CM MET WITH PATIENT AND SPOUSE (VIVEK) REGARDING D/C NEEDS AND PLANS. PATIENT
STATED HE LIVES WITH HIS WIFE AND SHE WILL DRIVE HIM HOME AT DISCHARGE.
PATIENT STATED THERE IS A RAMP TO ENTER HOME AND NO STAIRS INSIDE. PATIENTS
PCP IS DR. LIM AND USES [facility] ON [address] FOR HIS PHARMACY. PATIENT HAS
A WALKER, AND WHEELCHAIR AT HOME AND STATED HE IS INDEPENDENT WITH HIS CARE.
PATIENT STATED HE IS GOING TO TALK WITH HIS DOCTOR REGARDING SPEECH FROM HIS
STROKE. CM ASKED IF HE WANTED SPEECH AT DISCHARGE AND HE STATED HE WOULD TALK
TO HIS DOCTOR FIRST. CM WILL CONTINUE TO FOLLOW PATIENT WITH D/C NEEDS AND
PLANS.
 
 
PCP  DR. RAPHAEL
[facility] [address]-  [phone]
ESE ([facility]) WIFE-   [phone]
 
 
 
 
 
: Leann Crawford
 
Is the patient Alert and Oriented? Yes  0
* How many steps to enter\exit or inside your home? RAMP  0
* PCP DR. LIM  0
* Pharmacy [address] [facility]  0
* Preadmission Environment Home with Family  0
* ADLs Independent  0
* Equipment None  0
* List name and contact numbers for known caregivers / representatives who
currently or will assist patient after discharge: ESE RAGLAND (VIVEK---WIFE)
[phone]  0
* Community resources currently utilized None  0
* Additional services required to return to the preadmission environment? Yes
0
* Can the patient safely return to the preadmission environment? Yes  0
* Has this patient been hospitalized within the prior 30 days at any hospital?
No  0
    Grand Total:  0

## 2017-03-13 NOTE — NUR
PT UP TO RESTROOM, ASSESSMENT COMPLETED, NO DISTRESS NOTED, IV LOPRESSOR GIVEN
PER MAR, J CARLOS WELL, DENIES PAIN OR NEEDS AT THIS TIME, CL IN REACH, WILL
MONITOR

## 2017-03-13 NOTE — NUR
PATIENT ALERT IN MID SERVIN POSITION. RESPIRATIONS EVEN AND UNLABORED. SIDE
RAILS UP X2. BED IN LOW POSITION. CALL LIGHT IN REACH.

## 2017-03-14 VITALS — DIASTOLIC BLOOD PRESSURE: 59 MMHG | SYSTOLIC BLOOD PRESSURE: 116 MMHG

## 2017-03-14 VITALS — DIASTOLIC BLOOD PRESSURE: 86 MMHG | SYSTOLIC BLOOD PRESSURE: 148 MMHG

## 2017-03-14 LAB
ANION GAP SERPL CALC-SCNC: 9.3 MMOL/L (ref 8–16)
BASOPHILS NFR BLD AUTO: 0.1 % (ref 0–2)
BUN SERPL-MCNC: 3 MG/DL (ref 7–18)
CALCIUM SERPL-MCNC: 8.8 MG/DL (ref 8.5–10.1)
CHLORIDE SERPL-SCNC: 110 MMOL/L (ref 98–107)
CO2 SERPL-SCNC: 27.1 MMOL/L (ref 21–32)
CREAT SERPL-MCNC: 1.1 MG/DL (ref 0.6–1.3)
EOSINOPHIL NFR BLD: 1.6 % (ref 0–7)
ERYTHROCYTE [DISTWIDTH] IN BLOOD BY AUTOMATED COUNT: 14.4 % (ref 11.5–14.5)
GLUCOSE SERPL-MCNC: 93 MG/DL (ref 74–106)
HCT VFR BLD CALC: 33.9 % (ref 42–54)
HGB BLD-MCNC: 10.3 G/DL (ref 13.5–17.5)
IMM GRANULOCYTES NFR BLD: 0.9 % (ref 0–5)
LYMPHOCYTES NFR BLD AUTO: 9.6 % (ref 15–50)
MCH RBC QN AUTO: 26.5 PG (ref 26–34)
MCHC RBC AUTO-ENTMCNC: 30.4 G/DL (ref 31–37)
MCV RBC: 87.4 FL (ref 80–100)
MONOCYTES NFR BLD: 10.2 % (ref 2–11)
NEUTROPHILS NFR BLD AUTO: 77.6 % (ref 40–80)
OSMOLALITY SERPL CALC.SUM OF ELEC: 281 MOSM/KG (ref 275–300)
PLATELET # BLD: 243 10X3/UL (ref 130–400)
PMV BLD AUTO: 10.9 FL (ref 7.4–10.4)
POTASSIUM SERPL-SCNC: 3.4 MMOL/L (ref 3.5–5.1)
RBC # BLD AUTO: 3.88 10X6/UL (ref 4.2–6.1)
SODIUM SERPL-SCNC: 143 MMOL/L (ref 136–145)
WBC # BLD AUTO: 9.9 10X3/UL (ref 4.8–10.8)

## 2017-03-14 NOTE — NUR
ASSESSMENT COMPLETE NO ACUTE DISTRESS NTOED VOICES ALL NEEDS TO STAFF
AMBULATES WITH STEADY GAIT AD ARTI IN ROOM AND HALLWAY. LUNGS CLAER BIALTERALLY
BSA X 4 QUADS ABDOMEN SOFT AND NON TENDER. CALL LIGHT IN REACH WILL MONITOR.

## 2017-03-14 NOTE — NUR
PERIPHERIAL IV DISCONTINUED INTACT NO DIFFICULTY OR BLEEDING NOTED. DISCHARGE
INSTRUCTIONS GIVEN AND EXPRESSED UNDERSTANDING OF INSTRUCTIONS AND HOW TO TAKE
MEDS AND FOLLOW UP APPOINTMENTS LEFT VIA WHEELCHAIR WITH [facility] STAFF TO
PRIVATE VEHICLE WITH SPOUSE.

## 2017-03-14 NOTE — NUR
ALERT IN LOW SERVIN POSITION WITH PRIMARY NURSE REGINA, LPN AT BEDSIDE. NO
SIGNS OF DISTRESS NOTED. SIDE RAILS UP X2. BED IN LOW POSITION. CALL LIGHT IN
REACH.

## 2017-03-14 NOTE — NUR
CM REASSESSMENT NOTE:  PATIENT IS DISCHARGING HOME TODAY. FAMILY DRIVING HIM
HOME. PATIENT DENIES HOME HEALTH OR ANY OTHER NEEDS. IMM SERVED AND SIGNED

## 2017-03-24 ENCOUNTER — HOSPITAL ENCOUNTER (OUTPATIENT)
Dept: HOSPITAL 84 - D.LAB | Age: 77
Discharge: HOME | End: 2017-03-24
Attending: SURGERY
Payer: COMMERCIAL

## 2017-03-24 VITALS — BODY MASS INDEX: 20.1 KG/M2

## 2017-03-24 DIAGNOSIS — K57.92: Primary | ICD-10-CM

## 2017-03-24 DIAGNOSIS — L02.818: ICD-10-CM

## 2017-03-24 LAB
ANION GAP SERPL CALC-SCNC: 8.9 MMOL/L (ref 8–16)
BASOPHILS NFR BLD AUTO: 0.2 % (ref 0–2)
BUN SERPL-MCNC: 9 MG/DL (ref 7–18)
CALCIUM SERPL-MCNC: 9.5 MG/DL (ref 8.5–10.1)
CHLORIDE SERPL-SCNC: 108 MMOL/L (ref 98–107)
CO2 SERPL-SCNC: 31.4 MMOL/L (ref 21–32)
CREAT SERPL-MCNC: 1.4 MG/DL (ref 0.6–1.3)
EOSINOPHIL NFR BLD: 2 % (ref 0–7)
ERYTHROCYTE [DISTWIDTH] IN BLOOD BY AUTOMATED COUNT: 16 % (ref 11.5–14.5)
GLUCOSE SERPL-MCNC: 109 MG/DL (ref 74–106)
HCT VFR BLD CALC: 38.9 % (ref 42–54)
HGB BLD-MCNC: 12.1 G/DL (ref 13.5–17.5)
IMM GRANULOCYTES NFR BLD: 0.7 % (ref 0–5)
LYMPHOCYTES NFR BLD AUTO: 15.2 % (ref 15–50)
MCH RBC QN AUTO: 26.6 PG (ref 26–34)
MCHC RBC AUTO-ENTMCNC: 31.1 G/DL (ref 31–37)
MCV RBC: 85.5 FL (ref 80–100)
MONOCYTES NFR BLD: 9.4 % (ref 2–11)
NEUTROPHILS NFR BLD AUTO: 72.5 % (ref 40–80)
OSMOLALITY SERPL CALC.SUM OF ELEC: 286 MOSM/KG (ref 275–300)
PLATELET # BLD: 210 10X3/UL (ref 130–400)
PMV BLD AUTO: 12.2 FL (ref 7.4–10.4)
POTASSIUM SERPL-SCNC: 4.3 MMOL/L (ref 3.5–5.1)
RBC # BLD AUTO: 4.55 10X6/UL (ref 4.2–6.1)
SODIUM SERPL-SCNC: 144 MMOL/L (ref 136–145)
WBC # BLD AUTO: 9.8 10X3/UL (ref 4.8–10.8)

## 2017-05-02 NOTE — DS
PATIENT:SABRINA LAMAR                   :[birth year]   MEDICAL RECORD: [id number]
 
                              DISCHARGE SUMMARY
                                                         
ADMISSION DATE:    03/10/17                       DISCHARGE DATE:     17
 
 
DATE OF ADMISSION:  03/10/2017.
 
DATE OF DISCHARGE:  2017.
 
ADMISSION DIAGNOSES:
1.  Acute diverticulitis with abscess.
2.  History of thoracoabdominal aortic dissection.
3.  Hypertension.
4.  Hypercholesterolemia.
5.  Hypothyroidism.
 
DISCHARGE DIAGNOSES:
1.  Acute diverticulitis with abscess.
2.  History of thoracoabdominal aortic dissection.
3.  Hypertension.
4.  Hypercholesterolemia.
5.  Hypothyroidism.
 
PROCEDURES:  None.
 
CONSULTATIONS:  None.
 
REPORT OF HOSPITALIZATION:  The patient was admitted to the hospital with CT
findings of diverticulitis with a small abscess in the pelvis.  The patient was
admitted n.p.o. and placed on IV Zosyn.  He was on Pradaxa initially, but this
was held through this hospitalization with a chance that there may need to be
percutaneous drainage of the fluid collection.  We watch him over the next few
days and his symptoms improved significantly.  We rechecked the scan and saw
that the fluid collection was stable and decided that we would not proceed with
any drainage at this point.  He was discharged home at that time on Levaquin and
Flagyl.
 
DISCHARGE INSTRUCTIONS:  He will return to clinic or call if any questions or
concerns, fevers, chills, nausea, vomiting or worsening abdominal pain.
 
ACTIVITIES:  As tolerated.
 
FOLLOWUP:  In clinic with me in 1-2 weeks.
 
DISCHARGE MEDICATIONS:  Resume home medications with the inclusion of Levaquin
and Flagyl.
 
TRANSINT:[id number] Voice Confirmation ID: [id number] DOCUMENT ID: [id number]
 
 
 
DISCHARGE SUMMARY REPORT                       [id number]    SABRINA LAMAR CHRISTIAN MD          
 
 
 
Electronically Signed by JACOB HOUGH on 17 at 1451
 
 
 
 
 
 
 
 
 
 
 
 
 
 
 
 
 
 
 
 
 
 
 
 
 
 
 
 
 
 
 
 
 
 
 
 
 
 
 
 
 
CC:                                                             [id number]
DICTATION DATE: 17 1249     :     17 0248      DIS IN  
                                                                      17
[place] [id number]

## 2018-02-14 ENCOUNTER — HOSPITAL ENCOUNTER (OUTPATIENT)
Dept: HOSPITAL 84 - D.CT | Age: 78
Discharge: HOME | End: 2018-02-14
Attending: THORACIC SURGERY (CARDIOTHORACIC VASCULAR SURGERY)
Payer: COMMERCIAL

## 2018-02-14 VITALS — BODY MASS INDEX: 20.1 KG/M2

## 2018-02-14 DIAGNOSIS — I71.2: Primary | ICD-10-CM

## 2018-02-14 DIAGNOSIS — I71.9: ICD-10-CM

## 2019-01-22 ENCOUNTER — HOSPITAL ENCOUNTER (OUTPATIENT)
Dept: HOSPITAL 84 - D.LAB | Age: 79
Discharge: HOME | End: 2019-01-22
Attending: INTERNAL MEDICINE
Payer: COMMERCIAL

## 2019-01-22 VITALS — BODY MASS INDEX: 20.1 KG/M2

## 2019-01-22 DIAGNOSIS — I71.9: Primary | ICD-10-CM

## 2019-08-14 ENCOUNTER — HOSPITAL ENCOUNTER (OUTPATIENT)
Dept: HOSPITAL 84 - D.CT | Age: 79
Discharge: HOME | End: 2019-08-14
Attending: FAMILY MEDICINE
Payer: COMMERCIAL

## 2019-08-14 VITALS — BODY MASS INDEX: 20.1 KG/M2

## 2019-08-14 DIAGNOSIS — I71.4: Primary | ICD-10-CM

## 2020-03-10 ENCOUNTER — HOSPITAL ENCOUNTER (OUTPATIENT)
Dept: HOSPITAL 84 - D.LABREF | Age: 80
Discharge: HOME | End: 2020-03-10
Attending: FAMILY MEDICINE
Payer: COMMERCIAL

## 2020-03-10 VITALS — BODY MASS INDEX: 20.1 KG/M2

## 2020-03-10 DIAGNOSIS — N39.8: Primary | ICD-10-CM

## 2020-03-10 LAB
BACTERIA #/AREA URNS HPF: (no result) /HPF
BILIRUB SERPL-MCNC: NEGATIVE MG/DL
GLUCOSE SERPL-MCNC: NEGATIVE MG/DL
KETONES UR STRIP-MCNC: NEGATIVE MG/DL
NITRITE UR-MCNC: NEGATIVE MG/ML
PH UR STRIP: 5 [PH] (ref 5–6)
RBC #/AREA URNS HPF: (no result) /HPF (ref 0–5)
SP GR UR STRIP: 1.01 (ref 1–1.02)
UROBILINOGEN UR-MCNC: NORMAL MG/DL
WBC #/AREA URNS HPF: >50 /HPF

## 2020-09-16 ENCOUNTER — HOSPITAL ENCOUNTER (OUTPATIENT)
Dept: HOSPITAL 84 - D.CT | Age: 80
Discharge: HOME | End: 2020-09-16
Attending: THORACIC SURGERY (CARDIOTHORACIC VASCULAR SURGERY)
Payer: COMMERCIAL

## 2020-09-16 VITALS — BODY MASS INDEX: 20.1 KG/M2

## 2020-09-16 DIAGNOSIS — I71.2: Primary | ICD-10-CM

## 2020-09-16 DIAGNOSIS — I71.01: ICD-10-CM

## 2021-06-01 ENCOUNTER — HOSPITAL ENCOUNTER (OUTPATIENT)
Dept: HOSPITAL 84 - D.LAB | Age: 81
Discharge: HOME | End: 2021-06-01
Attending: FAMILY MEDICINE
Payer: COMMERCIAL

## 2021-06-01 VITALS — BODY MASS INDEX: 20.1 KG/M2

## 2021-06-01 DIAGNOSIS — E03.9: ICD-10-CM

## 2021-06-01 DIAGNOSIS — I71.9: ICD-10-CM

## 2021-06-01 DIAGNOSIS — Z00.00: Primary | ICD-10-CM

## 2021-06-01 DIAGNOSIS — I25.10: ICD-10-CM

## 2021-06-01 DIAGNOSIS — F01.50: ICD-10-CM

## 2021-06-01 LAB
ALBUMIN SERPL-MCNC: 3.5 G/DL (ref 3.4–5)
ALP SERPL-CCNC: 79 U/L (ref 30–120)
ALT SERPL-CCNC: 16 U/L (ref 10–68)
ANION GAP SERPL CALC-SCNC: 12.2 MMOL/L (ref 8–16)
BASOPHILS NFR BLD AUTO: 1.2 % (ref 0–2)
BILIRUB SERPL-MCNC: 0.65 MG/DL (ref 0.2–1.3)
BUN SERPL-MCNC: 22 MG/DL (ref 7–18)
CALCIUM SERPL-MCNC: 9.4 MG/DL (ref 8.5–10.1)
CHLORIDE SERPL-SCNC: 111 MMOL/L (ref 98–107)
CHOLEST/HDLC SERPL: 2.2 RATIO (ref 2.3–4.9)
CO2 SERPL-SCNC: 25.8 MMOL/L (ref 21–32)
CREAT SERPL-MCNC: 1.7 MG/DL (ref 0.6–1.3)
EOSINOPHIL NFR BLD: 4.2 % (ref 0–7)
ERYTHROCYTE [DISTWIDTH] IN BLOOD BY AUTOMATED COUNT: 15.6 % (ref 11.5–14.5)
EST. AVERAGE GLUCOSE BLD GHB EST-MCNC: 103 MG/DL (ref 74–154)
GLOBULIN SER-MCNC: 3 G/L
GLUCOSE SERPL-MCNC: 97 MG/DL (ref 74–106)
HCT VFR BLD CALC: 37.3 % (ref 42–54)
HDLC SERPL-MCNC: 62 MG/DL (ref 32–96)
HGB BLD-MCNC: 12 G/DL (ref 13.5–17.5)
LDL-HDL RATIO: 1.1 RATIO (ref 1.5–3.5)
LDLC SERPL-MCNC: 68 MG/DL (ref 0–100)
LYMPHOCYTES # BLD: 1 10X3/UL (ref 1.32–3.57)
LYMPHOCYTES NFR BLD AUTO: 19 % (ref 15–50)
MCH RBC QN AUTO: 27.3 PG (ref 26–34)
MCHC RBC AUTO-ENTMCNC: 32.1 G/DL (ref 31–37)
MCV RBC: 84.9 FL (ref 80–100)
MONOCYTES NFR BLD: 7.3 % (ref 2–11)
NEUTROPHILS # BLD: 3.6 10X3/UL (ref 1.78–5.38)
NEUTROPHILS NFR BLD AUTO: 68.3 % (ref 40–80)
OSMOLALITY SERPL CALC.SUM OF ELEC: 289 MOSM/KG (ref 275–300)
PLATELET # BLD: 176 10X3/UL (ref 130–400)
PMV BLD AUTO: 9 FL (ref 7.4–10.4)
POTASSIUM SERPL-SCNC: 5 MMOL/L (ref 3.5–5.1)
PROT SERPL-MCNC: 6.5 G/DL (ref 6.4–8.2)
RBC # BLD AUTO: 4.39 10X6/UL (ref 4.2–6.1)
SODIUM SERPL-SCNC: 144 MMOL/L (ref 136–145)
TRIGL SERPL-MCNC: 45 MG/DL (ref 30–200)
TSH SERPL-ACNC: 0.74 UIU/ML (ref 0.36–3.74)
WBC # BLD AUTO: 5.3 10X3/UL (ref 4.8–10.8)